# Patient Record
Sex: MALE | Race: WHITE | NOT HISPANIC OR LATINO | Employment: OTHER | URBAN - METROPOLITAN AREA
[De-identification: names, ages, dates, MRNs, and addresses within clinical notes are randomized per-mention and may not be internally consistent; named-entity substitution may affect disease eponyms.]

---

## 2020-10-26 ENCOUNTER — HISTORICAL (OUTPATIENT)
Dept: ENDOSCOPY | Facility: HOSPITAL | Age: 65
End: 2020-10-26

## 2020-12-08 ENCOUNTER — HISTORICAL (OUTPATIENT)
Dept: ADMINISTRATIVE | Facility: HOSPITAL | Age: 65
End: 2020-12-08

## 2020-12-08 LAB
ABS NEUT (OLG): 4.3 X10(3)/MCL (ref 2.1–9.2)
ALBUMIN SERPL-MCNC: 4.4 GM/DL (ref 3.4–5)
ALBUMIN/GLOB SERPL: 1.76 {RATIO} (ref 1.5–2.5)
ALP SERPL-CCNC: 77 UNIT/L (ref 38–126)
ALT SERPL-CCNC: 34 UNIT/L (ref 7–52)
APPEARANCE, UA: CLEAR
AST SERPL-CCNC: 35 UNIT/L (ref 15–37)
BACTERIA #/AREA URNS AUTO: ABNORMAL /HPF
BILIRUB SERPL-MCNC: 0.7 MG/DL (ref 0.2–1)
BILIRUB UR QL STRIP: NEGATIVE MG/DL
BILIRUBIN DIRECT+TOT PNL SERPL-MCNC: 0.2 MG/DL (ref 0–0.5)
BILIRUBIN DIRECT+TOT PNL SERPL-MCNC: 0.5 MG/DL
BUN SERPL-MCNC: 19 MG/DL (ref 7–18)
CALCIUM SERPL-MCNC: 9.3 MG/DL (ref 8.5–10.1)
CHLORIDE SERPL-SCNC: 103 MMOL/L (ref 98–107)
CHOLEST SERPL-MCNC: 133 MG/DL (ref 0–200)
CHOLEST/HDLC SERPL: 3.4 {RATIO}
CO2 SERPL-SCNC: 27 MMOL/L (ref 21–32)
COLOR UR: YELLOW
CREAT SERPL-MCNC: 1.13 MG/DL (ref 0.6–1.3)
ERYTHROCYTE [DISTWIDTH] IN BLOOD BY AUTOMATED COUNT: 13.2 % (ref 11.5–17)
EST. AVERAGE GLUCOSE BLD GHB EST-MCNC: 174 MG/DL
GLOBULIN SER-MCNC: 2.5 GM/DL (ref 1.2–3)
GLUCOSE (UA): NEGATIVE MG/DL
GLUCOSE SERPL-MCNC: 120 MG/DL (ref 74–106)
HBA1C MFR BLD: 7.7 % (ref 4.4–6.4)
HCT VFR BLD AUTO: 44.5 % (ref 42–52)
HDLC SERPL-MCNC: 39 MG/DL (ref 35–60)
HGB BLD-MCNC: 14.9 GM/DL (ref 14–18)
HGB UR QL STRIP: NEGATIVE UNIT/L
KETONES UR QL STRIP: NEGATIVE MG/DL
LDLC SERPL CALC-MCNC: 72 MG/DL (ref 0–129)
LEUKOCYTE ESTERASE UR QL STRIP: NEGATIVE UNIT/L
LYMPHOCYTES # BLD AUTO: 1.5 X10(3)/MCL (ref 0.6–3.4)
LYMPHOCYTES NFR BLD AUTO: 23.4 % (ref 13–40)
MCH RBC QN AUTO: 28.8 PG (ref 27–31.2)
MCHC RBC AUTO-ENTMCNC: 34 GM/DL (ref 32–36)
MCV RBC AUTO: 86 FL (ref 80–94)
MONOCYTES # BLD AUTO: 0.7 X10(3)/MCL (ref 0.1–1.3)
MONOCYTES NFR BLD AUTO: 10.9 % (ref 0.1–24)
NEUTROPHILS NFR BLD AUTO: 65.7 % (ref 47–80)
NITRITE UR QL STRIP.AUTO: NEGATIVE
PH UR STRIP: 5 [PH]
PLATELET # BLD AUTO: 123 X10(3)/MCL (ref 130–400)
PMV BLD AUTO: 9.5 FL (ref 9.4–12.4)
POTASSIUM SERPL-SCNC: 4.7 MMOL/L (ref 3.5–5.1)
PROT SERPL-MCNC: 6.9 GM/DL (ref 6.4–8.2)
PROT UR QL STRIP: ABNORMAL MG/DL
RBC # BLD AUTO: 5.17 X10(6)/MCL (ref 4.7–6.1)
RBC #/AREA URNS HPF: ABNORMAL /HPF
SODIUM SERPL-SCNC: 138 MMOL/L (ref 136–145)
SP GR UR STRIP: 1.02
SQUAMOUS EPITHELIAL, UA: ABNORMAL /LPF
TRIGL SERPL-MCNC: 250 MG/DL (ref 30–150)
UROBILINOGEN UR STRIP-ACNC: 0.2 MG/DL
VLDLC SERPL CALC-MCNC: 50 MG/DL
WBC # SPEC AUTO: 6.5 X10(3)/MCL (ref 4.5–11.5)
WBC #/AREA URNS AUTO: ABNORMAL /[HPF]

## 2020-12-09 ENCOUNTER — HISTORICAL (OUTPATIENT)
Dept: ENDOSCOPY | Facility: HOSPITAL | Age: 65
End: 2020-12-09

## 2020-12-09 LAB — CRC RECOMMENDATION EXT: NORMAL

## 2021-03-03 ENCOUNTER — HISTORICAL (OUTPATIENT)
Dept: ADMINISTRATIVE | Facility: HOSPITAL | Age: 66
End: 2021-03-03

## 2021-03-03 LAB
ALBUMIN SERPL-MCNC: 4.6 GM/DL (ref 3.4–5)
ALBUMIN/GLOB SERPL: 1.77 {RATIO} (ref 1.5–2.5)
ALP SERPL-CCNC: 75 UNIT/L (ref 38–126)
ALT SERPL-CCNC: 31 UNIT/L (ref 7–52)
AST SERPL-CCNC: 28 UNIT/L (ref 15–37)
BILIRUB SERPL-MCNC: 0.5 MG/DL (ref 0.2–1)
BILIRUBIN DIRECT+TOT PNL SERPL-MCNC: 0.2 MG/DL (ref 0–0.5)
BILIRUBIN DIRECT+TOT PNL SERPL-MCNC: 0.3 MG/DL
BUN SERPL-MCNC: 17 MG/DL (ref 7–18)
CALCIUM SERPL-MCNC: 9.6 MG/DL (ref 8.5–10.1)
CHLORIDE SERPL-SCNC: 106 MMOL/L (ref 98–107)
CO2 SERPL-SCNC: 27 MMOL/L (ref 21–32)
CREAT SERPL-MCNC: 1.21 MG/DL (ref 0.6–1.3)
CRP SERPL HS-MCNC: 0.74 MG/DL
ERYTHROCYTE [SEDIMENTATION RATE] IN BLOOD: 12 MM/HR (ref 0–15)
EST. AVERAGE GLUCOSE BLD GHB EST-MCNC: 151 MG/DL
GLOBULIN SER-MCNC: 2.6 GM/DL (ref 1.2–3)
GLUCOSE SERPL-MCNC: 122 MG/DL (ref 74–106)
HBA1C MFR BLD: 6.9 % (ref 4.4–6.4)
POTASSIUM SERPL-SCNC: 4.5 MMOL/L (ref 3.5–5.1)
PROT SERPL-MCNC: 7.2 GM/DL (ref 6.4–8.2)
SODIUM SERPL-SCNC: 142 MMOL/L (ref 136–145)

## 2021-03-15 ENCOUNTER — HISTORICAL (OUTPATIENT)
Dept: CARDIOLOGY | Facility: HOSPITAL | Age: 66
End: 2021-03-15

## 2021-06-07 ENCOUNTER — HISTORICAL (OUTPATIENT)
Dept: ADMINISTRATIVE | Facility: HOSPITAL | Age: 66
End: 2021-06-07

## 2021-06-07 LAB
ALBUMIN SERPL-MCNC: 4.5 GM/DL (ref 3.4–5)
ALBUMIN/GLOB SERPL: 1.96 {RATIO} (ref 1.5–2.5)
ALP SERPL-CCNC: 77 UNIT/L (ref 38–126)
ALT SERPL-CCNC: 36 UNIT/L (ref 7–52)
AST SERPL-CCNC: 32 UNIT/L (ref 15–37)
BILIRUB SERPL-MCNC: 0.6 MG/DL (ref 0.2–1)
BILIRUBIN DIRECT+TOT PNL SERPL-MCNC: 0.1 MG/DL (ref 0–0.5)
BILIRUBIN DIRECT+TOT PNL SERPL-MCNC: 0.5 MG/DL
BUN SERPL-MCNC: 21 MG/DL (ref 7–18)
CALCIUM SERPL-MCNC: 9.2 MG/DL (ref 8.5–10.1)
CHLORIDE SERPL-SCNC: 105 MMOL/L (ref 98–107)
CO2 SERPL-SCNC: 25 MMOL/L (ref 21–32)
CREAT SERPL-MCNC: 1.07 MG/DL (ref 0.6–1.3)
EST. AVERAGE GLUCOSE BLD GHB EST-MCNC: 232 MG/DL
GLOBULIN SER-MCNC: 2.3 GM/DL (ref 1.2–3)
GLUCOSE SERPL-MCNC: 280 MG/DL (ref 74–106)
HBA1C MFR BLD: 9.7 % (ref 4.4–6.4)
POTASSIUM SERPL-SCNC: 4.7 MMOL/L (ref 3.5–5.1)
PROT SERPL-MCNC: 6.8 GM/DL (ref 6.4–8.2)
SODIUM SERPL-SCNC: 134 MMOL/L (ref 136–145)

## 2021-09-07 ENCOUNTER — HISTORICAL (OUTPATIENT)
Dept: ADMINISTRATIVE | Facility: HOSPITAL | Age: 66
End: 2021-09-07

## 2021-09-07 LAB
ALBUMIN SERPL-MCNC: 4.6 GM/DL (ref 3.4–5)
ALBUMIN/GLOB SERPL: 1.77 {RATIO} (ref 1.5–2.5)
ALP SERPL-CCNC: 71 UNIT/L (ref 38–126)
ALT SERPL-CCNC: 30 UNIT/L (ref 7–52)
AST SERPL-CCNC: 32 UNIT/L (ref 15–37)
BILIRUB SERPL-MCNC: 0.6 MG/DL (ref 0.2–1)
BILIRUBIN DIRECT+TOT PNL SERPL-MCNC: 0.1 MG/DL (ref 0–0.5)
BILIRUBIN DIRECT+TOT PNL SERPL-MCNC: 0.5 MG/DL
BUN SERPL-MCNC: 23 MG/DL (ref 7–18)
CALCIUM SERPL-MCNC: 10 MG/DL (ref 8.5–10.1)
CHLORIDE SERPL-SCNC: 102 MMOL/L (ref 98–107)
CO2 SERPL-SCNC: 24 MMOL/L (ref 21–32)
CREAT SERPL-MCNC: 1.07 MG/DL (ref 0.6–1.3)
EST. AVERAGE GLUCOSE BLD GHB EST-MCNC: 157 MG/DL
GLOBULIN SER-MCNC: 2.6 GM/DL (ref 1.2–3)
GLUCOSE SERPL-MCNC: 124 MG/DL (ref 74–106)
HBA1C MFR BLD: 7.1 % (ref 4.4–6.4)
POTASSIUM SERPL-SCNC: 4.5 MMOL/L (ref 3.5–5.1)
PROT SERPL-MCNC: 7.2 GM/DL (ref 6.4–8.2)
SODIUM SERPL-SCNC: 140 MMOL/L (ref 136–145)

## 2021-12-07 ENCOUNTER — HISTORICAL (OUTPATIENT)
Dept: ADMINISTRATIVE | Facility: HOSPITAL | Age: 66
End: 2021-12-07

## 2021-12-07 LAB
ALBUMIN SERPL-MCNC: 4.6 GM/DL (ref 3.4–5)
ALBUMIN/GLOB SERPL: 2.09 {RATIO} (ref 1.5–2.5)
ALP SERPL-CCNC: 71 UNIT/L (ref 38–126)
ALT SERPL-CCNC: 34 UNIT/L (ref 7–52)
AST SERPL-CCNC: 33 UNIT/L (ref 15–37)
BILIRUB SERPL-MCNC: 0.6 MG/DL (ref 0.2–1)
BILIRUBIN DIRECT+TOT PNL SERPL-MCNC: 0.2 MG/DL (ref 0–0.5)
BILIRUBIN DIRECT+TOT PNL SERPL-MCNC: 0.4 MG/DL
BUN SERPL-MCNC: 24 MG/DL (ref 7–18)
CALCIUM SERPL-MCNC: 9.2 MG/DL (ref 8.5–10.1)
CHLORIDE SERPL-SCNC: 100 MMOL/L (ref 98–107)
CO2 SERPL-SCNC: 29 MMOL/L (ref 21–32)
CREAT SERPL-MCNC: 1.23 MG/DL (ref 0.6–1.3)
EST. AVERAGE GLUCOSE BLD GHB EST-MCNC: 192 MG/DL
GLOBULIN SER-MCNC: 2.2 GM/DL (ref 1.2–3)
GLUCOSE SERPL-MCNC: 156 MG/DL (ref 74–106)
HBA1C MFR BLD: 8.3 % (ref 4.4–6.4)
POTASSIUM SERPL-SCNC: 4.9 MMOL/L (ref 3.5–5.1)
PROT SERPL-MCNC: 6.8 GM/DL (ref 6.4–8.2)
SODIUM SERPL-SCNC: 138 MMOL/L (ref 136–145)

## 2022-04-11 ENCOUNTER — HISTORICAL (OUTPATIENT)
Dept: ADMINISTRATIVE | Facility: HOSPITAL | Age: 67
End: 2022-04-11

## 2022-04-28 VITALS
BODY MASS INDEX: 42.66 KG/M2 | WEIGHT: 315 LBS | HEIGHT: 72 IN | OXYGEN SATURATION: 94 % | DIASTOLIC BLOOD PRESSURE: 86 MMHG | SYSTOLIC BLOOD PRESSURE: 140 MMHG

## 2022-05-26 PROBLEM — I25.118 CORONARY ARTERY DISEASE OF NATIVE ARTERY OF NATIVE HEART WITH STABLE ANGINA PECTORIS: Status: ACTIVE | Noted: 2021-08-20

## 2022-05-26 PROBLEM — G47.33 OSA ON CPAP: Status: ACTIVE | Noted: 2022-01-07

## 2022-05-26 PROBLEM — Z86.718 PERSONAL HISTORY OF DVT (DEEP VEIN THROMBOSIS): Status: ACTIVE | Noted: 2022-01-10

## 2022-05-26 PROBLEM — I48.0 PAF (PAROXYSMAL ATRIAL FIBRILLATION): Status: ACTIVE | Noted: 2022-01-10

## 2022-05-26 PROBLEM — R60.0 EDEMA OF LOWER EXTREMITY: Status: ACTIVE | Noted: 2022-05-26

## 2022-05-26 PROBLEM — G89.4 CHRONIC PAIN SYNDROME: Status: ACTIVE | Noted: 2022-05-26

## 2022-05-26 PROBLEM — N40.1 BENIGN PROSTATIC HYPERPLASIA WITH URINARY OBSTRUCTION: Status: ACTIVE | Noted: 2022-05-26

## 2022-05-26 PROBLEM — I35.0 SEVERE AORTIC VALVE STENOSIS: Status: ACTIVE | Noted: 2022-05-26

## 2022-05-26 PROBLEM — I87.2 VENOUS INSUFFICIENCY: Status: ACTIVE | Noted: 2022-05-26

## 2022-05-26 PROBLEM — T88.4XXA DIFFICULT AIRWAY: Status: ACTIVE | Noted: 2022-01-10

## 2022-05-26 PROBLEM — M19.90 OSTEOARTHRITIS: Status: ACTIVE | Noted: 2022-05-26

## 2022-05-26 PROBLEM — N13.8 BENIGN PROSTATIC HYPERPLASIA WITH URINARY OBSTRUCTION: Status: ACTIVE | Noted: 2022-05-26

## 2022-05-26 PROBLEM — I10 HTN (HYPERTENSION): Status: ACTIVE | Noted: 2022-05-26

## 2022-05-26 PROBLEM — Z86.711 PERSONAL HISTORY OF PULMONARY EMBOLISM: Status: ACTIVE | Noted: 2022-01-09

## 2022-05-26 PROBLEM — I82.409 DEEP VEIN THROMBOSIS (DVT) OF LOWER EXTREMITY: Status: ACTIVE | Noted: 2022-03-14

## 2022-05-26 PROBLEM — E66.01 MORBID OBESITY: Status: ACTIVE | Noted: 2022-01-07

## 2022-05-26 PROBLEM — E11.9 DMII (DIABETES MELLITUS, TYPE 2): Status: ACTIVE | Noted: 2022-05-26

## 2022-06-09 ENCOUNTER — LAB REQUISITION (OUTPATIENT)
Dept: LAB | Facility: HOSPITAL | Age: 67
End: 2022-06-09
Payer: MEDICARE

## 2022-06-09 DIAGNOSIS — I25.119 ATHEROSCLEROTIC HEART DISEASE OF NATIVE CORONARY ARTERY WITH UNSPECIFIED ANGINA PECTORIS: ICD-10-CM

## 2022-06-09 LAB
INR BLD: 5.29 (ref 0–1.3)
PROTHROMBIN TIME: 47.2 SECONDS (ref 12.5–14.5)

## 2022-06-09 PROCEDURE — 85610 PROTHROMBIN TIME: CPT | Performed by: FAMILY MEDICINE

## 2022-06-13 ENCOUNTER — OFFICE VISIT (OUTPATIENT)
Dept: ORTHOPEDICS | Facility: CLINIC | Age: 67
End: 2022-06-13
Payer: MEDICARE

## 2022-06-13 ENCOUNTER — HOSPITAL ENCOUNTER (OUTPATIENT)
Dept: RADIOLOGY | Facility: CLINIC | Age: 67
Discharge: HOME | End: 2022-06-13
Attending: REHABILITATION UNIT
Payer: MEDICARE

## 2022-06-13 VITALS — HEIGHT: 73 IN | WEIGHT: 315 LBS | BODY MASS INDEX: 41.75 KG/M2

## 2022-06-13 DIAGNOSIS — M25.562 LEFT KNEE PAIN, UNSPECIFIED CHRONICITY: ICD-10-CM

## 2022-06-13 DIAGNOSIS — M25.562 LEFT KNEE PAIN, UNSPECIFIED CHRONICITY: Primary | ICD-10-CM

## 2022-06-13 DIAGNOSIS — M17.12 OSTEOARTHRITIS OF LEFT KNEE, UNSPECIFIED OSTEOARTHRITIS TYPE: ICD-10-CM

## 2022-06-13 PROCEDURE — 99203 OFFICE O/P NEW LOW 30 MIN: CPT | Mod: ,,, | Performed by: REHABILITATION UNIT

## 2022-06-13 PROCEDURE — 73564 XR KNEE COMP 4 OR MORE VIEWS LEFT: ICD-10-PCS | Mod: LT,,, | Performed by: REHABILITATION UNIT

## 2022-06-13 PROCEDURE — 73564 X-RAY EXAM KNEE 4 OR MORE: CPT | Mod: LT,,, | Performed by: REHABILITATION UNIT

## 2022-06-13 PROCEDURE — 99203 PR OFFICE/OUTPT VISIT, NEW, LEVL III, 30-44 MIN: ICD-10-PCS | Mod: ,,, | Performed by: REHABILITATION UNIT

## 2022-06-13 NOTE — PROGRESS NOTES
Subjective:      Issa Valladares is a 66 y.o. male referred by Dr. Alfaro for evaluation and treatment of left knee pain. The pain began around 5 weeks ago. He denies any trauma or distinct injury. He is using a walker today and has used it since his pain flared up the past few weeks. He was provided with a steroid dose sherin which has provided pain relief. He is in pain management and takes oxycodone and morphine for back pain and fibromylagia. He reports that his pain level is around 2-3/10 currently. Pain is worse with activity and somewhat better with rest. He has not had any PT. R knee CSI years ago that lasted a few months. He has had an MRI completed. He reports around a 50 pound weight loss in the recent past.     PMH significant for DVT and PE around 8 years ago. He started coumadin a few months ago after transitioning from eliquis. He has had difficulties with coagulation lab control. Aortic valve replacement around 4 months ago and reports he is doing well. Reports blood glucose levels around 200.       Comprehensive review of systems completed and negative except as per HPI.    Past Medical History:   Diagnosis Date    Aortic stenosis, severe     BPH with obstruction/lower urinary tract symptoms     Essential (primary) hypertension     Fibromyalgia     History of DVT (deep vein thrombosis)     Hx of pulmonary embolus     Type 2 diabetes mellitus with unspecified diabetic retinopathy without macular edema     Unspecified osteoarthritis, unspecified site        Past Surgical History:   Procedure Laterality Date    1 vessel CABG (01/07/2022)      Angiogram (05/14/2021)      AVR - Bovine (01/07/2022)      Biopsy Gastrointestinal (None) (10/26/2020)      Bleeder Control Gastrointestinal (None)       CHOLECYSTECTOMY      Colonoscopy (None) (10/26/2020)      Polypectomy (None) (10/26/2020)      Polypectomy (None) (12/09/2020)      RIGHT SHOULDER         History reviewed. No pertinent family  "history.    Social History     Socioeconomic History    Marital status:    Tobacco Use    Smoking status: Former Smoker    Smokeless tobacco: Never Used   Substance and Sexual Activity    Alcohol use: Yes     Alcohol/week: 1.0 - 2.0 standard drink     Types: 1 - 2 Cans of beer per week    Drug use: Never       Current Outpatient Medications   Medication Sig Dispense Refill    acetaminophen (TYLENOL) 325 MG tablet Take 650 mg by mouth every 4 (four) hours as needed.      amiodarone (PACERONE) 200 MG Tab Take 200 mg by mouth.      amLODIPine (NORVASC) 10 MG tablet Take 10 mg by mouth.      atorvastatin (LIPITOR) 10 MG tablet Take 10 mg by mouth.      BD VIKAS 2ND GEN PEN NEEDLE 32 gauge x 5/32" Ndle       blood sugar diagnostic Strp Use as directed three times daily      furosemide (LASIX) 40 MG tablet       insulin detemir U-100 (LEVEMIR FLEXTOUCH U-100 INSULN) 100 unit/mL (3 mL) InPn pen Inject 30 Units into the skin once daily. 1 packet 5    insulin lispro 100 unit/mL pen Inject 15 Units into the skin.      lancets 33 gauge Misc 1 each by Other route.      lisinopriL-hydrochlorothiazide (PRINZIDE,ZESTORETIC) 20-25 mg Tab Take 1 tablet by mouth 2 (two) times daily.      metoprolol succinate (TOPROL-XL) 25 MG 24 hr tablet Take 25 mg by mouth.      metoprolol succinate (TOPROL-XL) 50 MG 24 hr tablet Take 50 mg by mouth.      morphine (MS CONTIN) 15 MG 12 hr tablet Take 15 mg by mouth 2 (two) times daily as needed.      multivit-iron-FA-calcium-mins 9 mg iron-400 mcg Tab tablet Take 1 tablet by mouth once daily.      oxyCODONE-acetaminophen (PERCOCET)  mg per tablet       tiZANidine 4 mg Cap Take 4 mg by mouth 3 (three) times daily as needed.      warfarin (COUMADIN) 5 MG tablet       predniSONE (DELTASONE) 20 MG tablet Take 2 tablets (40 mg total) by mouth once daily. for 7 days 14 tablet 0     No current facility-administered medications for this visit.       Review of patient's " allergies indicates:  No Known Allergies    Objective:         Head: Normocephalic, without obvious abnormality, atraumatic  Eyes: conjunctivae/corneas clear. EOM's intact  Ears: normal external appearance  Nose: Nares normal. Septum midline. Mucosa normal. No drainage  Throat: normal findings: lips normal without lesions  Lungs: unlabored breathing on room air  Chest wall: symmetric chest rise  Heart: regular rate and rhythm  Pulses: 2+ and symmetric  Skin: Skin color, texture, turgor normal. No rashes or lesions  Neurologic: Grossly normal    LEFT KNEE:     Alignment: neutral  Appearance: skin in intact without lesion  Effusion:   Gait: antalgic with a walker  Straight Leg Raise: negative  Log Roll: negative  Hip ROM: full and painless  Ankle ROM: full and painless   Knee ROM:  0-115  Tenderness: TTP at the medial joint line and posteriorly   Patellar Mobility: decreased and painful   Patellar grind: +  PF Crepitus: +    Solange Test: +  Valgus Stress @ 0 deg: stable  Valgus Stress @ 30 deg: stable  Varus Stress @ 0 deg: stable  Varus Stress @ 30 deg: stable  Lachman: stable  Ant Drawer: negative   Post Drawer: negative  Posterior Sag Sign: negative  Neurological deficits: SILT dp/sp/t distributions  Motor: 5/5 EHL/FHL/TA/GS    Warm well perfused lower extremity with capillary refill less than 2 seconds and sensation intact to light touch in the terminal nerve distributions. Calf soft and easily compressible without clinical sign of DVT. No palpable popliteal lymphadenopathy. Chronic venous stasis changes overlying anterior tibia      Imaging  Four view radiographs of bilateral knees obtained today personally region acute fracture dislocation.  Advanced arthritic changes most severely in the medial compartments with loss of joint space and surrounding osteophyte formation.  Small osteophytes about the patellofemoral compartment as well.    Results for orders placed or performed in visit on 05/26/22 (from the past  2160 hour(s))   MRI Knee Without Contrast Left    Impression    Left knee moderate joint effusion.  Large Baker cyst, containing multiple small intra-articular bodies.    Partially imaged ganglion cyst within the calf muscles containing intra-articular bodies, appearing to arise from the popliteal tendon sheath.    Radial tears involving the medial and lateral meniscus posterior horn root.    Extrusion of the medial meniscus body from the joint, with degenerative signal throughout the medial meniscus body and posterior horn.    Mucoid degeneration of the anterior and posterior cruciate ligaments.    Advanced medial and moderate patellofemoral compartment arthritis.      Electronically signed by: Giuseppe Galarza  Date:    06/08/2022  Time:    08:47      Assessment:     66M with primary OA of his left knee     Plan:     Imaging and exam findings discussed with the patient.  He has radiographic evidence of end-stage arthritis changes of his left knee.  MRI findings consistent with arthritis changes as well as subsequent meniscal tears which are expected in the setting of this advanced arthritis.  We discussed his options.  His medical condition currently limits some of his options.  He is unable to take nonsteroidal anti-inflammatories.  He has got good relief from steroids but this isn't a great long term option.  Unfortunately he is diabetic and this is not well controlled. He reports that his blood sugar has been around 200 level. He is in pain management for other issues.  He has done a good job with weight loss but would benefit from further weight loss.  We discussed the implications on his current state as well as the ability to have surgery in the future.  He is on Coumadin currently with the elevated INR.  I think he would benefit from a corticosteroid injection, but given his coagulation labs will defer injection at this time.  He will be candidate once his labs are in the therapeutic range.  He is not a  candidate for meniscal surgery.  He may benefit from an arthroplasty in the future but this would require a BMI less than 40 and a hemoglobin A1c under 7 all this was discussed with the patient.  He will call back if his coagulation panel is improved and he desires to go forward with an injection.  All his questions were answered and he was in agreement.

## 2022-06-14 PROBLEM — M17.12 OSTEOARTHRITIS OF LEFT KNEE: Status: ACTIVE | Noted: 2022-06-14

## 2022-06-28 DIAGNOSIS — M89.9 RIB LESION: Primary | ICD-10-CM

## 2022-06-29 ENCOUNTER — OFFICE VISIT (OUTPATIENT)
Dept: ORTHOPEDICS | Facility: CLINIC | Age: 67
End: 2022-06-29
Payer: MEDICARE

## 2022-06-29 VITALS — WEIGHT: 315 LBS | HEIGHT: 73 IN | BODY MASS INDEX: 41.75 KG/M2

## 2022-06-29 DIAGNOSIS — M17.11 PRIMARY OSTEOARTHRITIS OF RIGHT KNEE: ICD-10-CM

## 2022-06-29 DIAGNOSIS — M17.12 OSTEOARTHRITIS OF LEFT KNEE, UNSPECIFIED OSTEOARTHRITIS TYPE: Primary | ICD-10-CM

## 2022-06-29 PROCEDURE — 20610 LARGE JOINT ASPIRATION/INJECTION: BILATERAL KNEE: ICD-10-PCS | Mod: 50,,, | Performed by: REHABILITATION UNIT

## 2022-06-29 PROCEDURE — 99213 PR OFFICE/OUTPT VISIT, EST, LEVL III, 20-29 MIN: ICD-10-PCS | Mod: 25,,, | Performed by: REHABILITATION UNIT

## 2022-06-29 PROCEDURE — 99213 OFFICE O/P EST LOW 20 MIN: CPT | Mod: 25,,, | Performed by: REHABILITATION UNIT

## 2022-06-29 PROCEDURE — 20610 DRAIN/INJ JOINT/BURSA W/O US: CPT | Mod: 50,,, | Performed by: REHABILITATION UNIT

## 2022-06-29 RX ADMIN — BETAMETHASONE SODIUM PHOSPHATE AND BETAMETHASONE ACETATE 12 MG: 3; 3 INJECTION, SUSPENSION INTRA-ARTICULAR; INTRALESIONAL; INTRAMUSCULAR; SOFT TISSUE at 02:06

## 2022-06-29 RX ADMIN — LIDOCAINE HYDROCHLORIDE 2 MG: 20 INJECTION, SOLUTION INFILTRATION; PERINEURAL at 02:06

## 2022-06-29 NOTE — PROGRESS NOTES
Subjective:      Issa Valladares is a 67 y.o. male who presents today for follow up evaluation of his left knee. He was seen initially two weeks ago. He had an exacerbation of left knee pain. He has end-stage arthritis changes on radiographs. Unfortunately, he is on coumadin and his INR was greatly elevated. He presents today with INR within the therapeutic range. He is interested in CSI. He also reports that his right knee is bothering him. Pain with activities and better with rest. No sensorimotor changes.     Initial HPI:  Referred by Dr. Alfaro for evaluation and treatment of left knee pain. The pain began around 5 weeks ago. He denies any trauma or distinct injury. He is using a walker today and has used it since his pain flared up the past few weeks. He was provided with a steroid dose sherin which has provided pain relief. He is in pain management and takes oxycodone and morphine for back pain and fibromylagia. He reports that his pain level is around 2-3/10 currently. Pain is worse with activity and somewhat better with rest. He has not had any PT. R knee CSI years ago that lasted a few months. He has had an MRI completed. He reports around a 50 pound weight loss in the recent past.     PMH significant for DVT and PE around 8 years ago. He started coumadin a few months ago after transitioning from eliquis. He has had difficulties with coagulation lab control. Aortic valve replacement around 4 months ago and reports he is doing well. Reports blood glucose levels around 200.       Comprehensive review of systems completed and negative except as per HPI.    Past Medical History:   Diagnosis Date    Aortic stenosis, severe     BPH with obstruction/lower urinary tract symptoms     Essential (primary) hypertension     Fibromyalgia     History of DVT (deep vein thrombosis)     Hx of pulmonary embolus     Personal history of colonic polyps 12/09/2020    COLONOSCOPY    Type 2 diabetes mellitus with unspecified  "diabetic retinopathy without macular edema     Unspecified osteoarthritis, unspecified site        Past Surgical History:   Procedure Laterality Date    1 vessel CABG (01/07/2022)      Angiogram (05/14/2021)      AVR - Bovine (01/07/2022)      Biopsy Gastrointestinal (None) (10/26/2020)      Bleeder Control Gastrointestinal (None)       CHOLECYSTECTOMY      COLONOSCOPY  12/09/2020    Ramon Desouza MD    Colonoscopy (None) (10/26/2020)      Polypectomy (None) (10/26/2020)      Polypectomy (None) (12/09/2020)      RIGHT SHOULDER         History reviewed. No pertinent family history.    Social History     Socioeconomic History    Marital status:    Tobacco Use    Smoking status: Former    Smokeless tobacco: Never   Substance and Sexual Activity    Alcohol use: Yes     Alcohol/week: 1.0 - 2.0 standard drink     Types: 1 - 2 Cans of beer per week    Drug use: Never       Current Outpatient Medications   Medication Sig Dispense Refill    amiodarone (PACERONE) 200 MG Tab Take 200 mg by mouth.      amLODIPine (NORVASC) 10 MG tablet Take 10 mg by mouth.      apixaban (ELIQUIS) 5 mg Tab Take 1 tablet (5 mg total) by mouth 2 (two) times daily. 180 tablet 3    atorvastatin (LIPITOR) 10 MG tablet Take 10 mg by mouth.      BD VIKAS 2ND GEN PEN NEEDLE 32 gauge x 5/32" Ndle       blood sugar diagnostic Strp Use as directed three times daily      furosemide (LASIX) 40 MG tablet       insulin detemir U-100 (LEVEMIR FLEXTOUCH U-100 INSULN) 100 unit/mL (3 mL) InPn pen Inject 35 Units into the skin once daily. 12 mL 5    insulin lispro 100 unit/mL pen Inject 20 Units into the skin 3 (three) times daily before meals. 18 mL 11    lancets 33 gauge Misc 1 each by Other route.      lisinopriL-hydrochlorothiazide (PRINZIDE,ZESTORETIC) 20-25 mg Tab Take 1 tablet by mouth once daily.      metoprolol succinate (TOPROL-XL) 25 MG 24 hr tablet Take 25 mg by mouth.      metoprolol succinate (TOPROL-XL) 50 MG 24 hr tablet Take 1.5 tablets " (75 mg total) by mouth once daily. 135 tablet 3    morphine (MS CONTIN) 15 MG 12 hr tablet Take 15 mg by mouth 2 (two) times daily as needed.      multivit-iron-FA-calcium-mins 9 mg iron-400 mcg Tab tablet Take 1 tablet by mouth once daily.      NOVOLOG FLEXPEN U-100 INSULIN 100 unit/mL (3 mL) InPn pen Inject into the skin.      ondansetron (ZOFRAN-ODT) 4 MG TbDL Take 2 tablets (8 mg total) by mouth every 8 (eight) hours as needed (nausea/vomiting). 10 tablet 0    oxyCODONE-acetaminophen (PERCOCET)  mg per tablet       tiZANidine 4 mg Cap Take 4 mg by mouth 3 (three) times daily as needed.       No current facility-administered medications for this visit.       Review of patient's allergies indicates:  No Known Allergies    Objective:         Head: Normocephalic, without obvious abnormality, atraumatic  Eyes: conjunctivae/corneas clear. EOM's intact  Ears: normal external appearance  Nose: Nares normal. Septum midline. Mucosa normal. No drainage  Throat: normal findings: lips normal without lesions  Lungs: unlabored breathing on room air  Chest wall: symmetric chest rise  Heart: regular rate and rhythm  Pulses: 2+ and symmetric  Skin: Skin color, texture, turgor normal. No rashes or lesions  Neurologic: Grossly normal    Bilateral KNEE:     Alignment: neutral  Appearance: skin in intact without lesion  Effusion: none  Gait: antalgic with a walker  Straight Leg Raise: negative  Log Roll: negative  Hip ROM: full and painless  Ankle ROM: full and painless   Knee ROM:  0-115  Tenderness: TTP at the medial joint line and posteriorly   Patellar Mobility: decreased and painful   Patellar grind: +  PF Crepitus: +    Solange Test: +  Valgus Stress @ 0 deg: stable  Valgus Stress @ 30 deg: stable  Varus Stress @ 0 deg: stable  Varus Stress @ 30 deg: stable  Lachman: stable  Ant Drawer: negative   Post Drawer: negative  Posterior Sag Sign: negative  Neurological deficits: SILT dp/sp/t distributions  Motor: 5/5  EHL/FHL/TA/GS    Warm well perfused lower extremity with capillary refill less than 2 seconds and sensation intact to light touch in the terminal nerve distributions. Calf soft and easily compressible without clinical sign of DVT. No palpable popliteal lymphadenopathy. Chronic venous stasis changes overlying anterior tibia    Imaging  Four view radiographs of bilateral knees obtained previously show no acute fracture dislocation.  Advanced arthritic changes most severely in the medial compartments with loss of joint space and surrounding osteophyte formation.  Small osteophytes about the patellofemoral compartment as well.    Large Joint Aspiration/Injection: bilateral knee    Date/Time: 6/29/2022 2:45 PM  Performed by: Christopher Fernando MD  Authorized by: Christopher Fernando MD     Consent Done?:  Yes (Verbal)  Indications:  Arthritis and pain  Site marked: the procedure site was marked    Timeout: prior to procedure the correct patient, procedure, and site was verified    Prep: patient was prepped and draped in usual sterile fashion      Local anesthesia used?: Yes    Local anesthetic:  Topical anesthetic    Details:  Needle Size:  21 G  Ultrasonic Guidance for needle placement?: No    Approach:  Anterolateral  Location:  Knee  Laterality:  Bilateral  Site:  Bilateral knee  Medications (Right):  2 mg LIDOcaine HCL 20 mg/ml (2%) 20 mg/mL (2 %); 12 mg betamethasone acetate-betamethasone sodium phosphate 6 mg/mL  Medications (Left):  2 mg LIDOcaine HCL 20 mg/ml (2%) 20 mg/mL (2 %); 12 mg betamethasone acetate-betamethasone sodium phosphate 6 mg/mL  Patient tolerance:  Patient tolerated the procedure well with no immediate complications      Assessment:     66M with primary OA of his bilateral knees     Plan:     Imaging and exam findings discussed with the patient.  He has radiographic evidence of end-stage arthritis changes of his bilateral knees. His INR is within the therapeutic range. He was provided with bilateral  knee CSI and tolerated this well as above. He will monitor his blood glucose which he reports has been doing well. He has had substantial weight loss and will continue to monitor for arthroplasty candidacy. He will follow up as needed. All his questions were answered and he was in agreement.

## 2022-07-06 ENCOUNTER — HOSPITAL ENCOUNTER (OUTPATIENT)
Dept: RADIOLOGY | Facility: CLINIC | Age: 67
Discharge: HOME | End: 2022-07-06
Attending: REHABILITATION UNIT
Payer: MEDICARE

## 2022-07-06 ENCOUNTER — OFFICE VISIT (OUTPATIENT)
Dept: ORTHOPEDICS | Facility: CLINIC | Age: 67
End: 2022-07-06
Payer: MEDICARE

## 2022-07-06 VITALS
DIASTOLIC BLOOD PRESSURE: 87 MMHG | SYSTOLIC BLOOD PRESSURE: 144 MMHG | HEART RATE: 86 BPM | BODY MASS INDEX: 41.75 KG/M2 | HEIGHT: 73 IN | WEIGHT: 315 LBS

## 2022-07-06 DIAGNOSIS — M25.511 ACUTE PAIN OF BOTH SHOULDERS: Primary | ICD-10-CM

## 2022-07-06 DIAGNOSIS — M25.511 ACUTE PAIN OF RIGHT SHOULDER: ICD-10-CM

## 2022-07-06 DIAGNOSIS — M25.512 ACUTE PAIN OF BOTH SHOULDERS: Primary | ICD-10-CM

## 2022-07-06 PROCEDURE — 73030 X-RAY EXAM OF SHOULDER: CPT | Mod: LT,,, | Performed by: REHABILITATION UNIT

## 2022-07-06 PROCEDURE — 73030 X-RAY EXAM OF SHOULDER: CPT | Mod: RT,,, | Performed by: REHABILITATION UNIT

## 2022-07-06 PROCEDURE — 99213 PR OFFICE/OUTPT VISIT, EST, LEVL III, 20-29 MIN: ICD-10-PCS | Mod: ,,, | Performed by: REHABILITATION UNIT

## 2022-07-06 PROCEDURE — 73030 XR SHOULDER COMPLETE 2 OR MORE VIEWS LEFT: ICD-10-PCS | Mod: LT,,, | Performed by: REHABILITATION UNIT

## 2022-07-06 PROCEDURE — 99213 OFFICE O/P EST LOW 20 MIN: CPT | Mod: ,,, | Performed by: REHABILITATION UNIT

## 2022-07-06 NOTE — PROGRESS NOTES
Subjective:      Patient ID: Issa Valladares is a 67 y.o. male.    Chief Complaint: Pain (R shoulder pain, pt states both shoulders are in pain, right hurts more than the left, said the pain started about two weeks ago, pt states pain is sharp stabbing pain and throbbing ache, pt is not able to sleep as pain keeps him up at the night, pt is taking oxycodone for pain with no to little relief, )    HPI:   Issa Valladares is a 67M who presents today for initial evaluation of his bilateral shoulders.  I have seen previously for knee pain.  He reports that since I last saw him he fell within the past couple weeks and landed on his upper extremities.  He has pain to both of his shoulders with the right shoulder bothering him more.  Pain is diffuse and global.  It is bothersome at night.  He has been taking narcotics with little to no relief.  No sensory motor changes reported distally however he has significant loss of range of motion of the shoulder with pain.  Patient has a pertinent past surgical history of right rotator cuff repair around 20-25 years ago.  He recovered well and has had no issues here recently.  Pain is worse with activity and better with rest.  He reports that his knees are improving.    Past Medical History:   Diagnosis Date    Aortic stenosis, severe     BPH with obstruction/lower urinary tract symptoms     Essential (primary) hypertension     Fibromyalgia     History of DVT (deep vein thrombosis)     Hx of pulmonary embolus     Personal history of colonic polyps 12/09/2020    COLONOSCOPY    Type 2 diabetes mellitus with unspecified diabetic retinopathy without macular edema     Unspecified osteoarthritis, unspecified site      Past Surgical History:   Procedure Laterality Date    1 vessel CABG (01/07/2022)      Angiogram (05/14/2021)      AVR - Bovine (01/07/2022)      Biopsy Gastrointestinal (None) (10/26/2020)      Bleeder Control Gastrointestinal (None)       CHOLECYSTECTOMY    "   COLONOSCOPY  12/09/2020    Ramon Desouza MD    Colonoscopy (None) (10/26/2020)      Polypectomy (None) (10/26/2020)      Polypectomy (None) (12/09/2020)      RIGHT SHOULDER       Social History     Socioeconomic History    Marital status:    Tobacco Use    Smoking status: Former Smoker    Smokeless tobacco: Never Used   Substance and Sexual Activity    Alcohol use: Yes     Alcohol/week: 1.0 - 2.0 standard drink     Types: 1 - 2 Cans of beer per week    Drug use: Never         Current Outpatient Medications:     amiodarone (PACERONE) 200 MG Tab, Take 200 mg by mouth., Disp: , Rfl:     amLODIPine (NORVASC) 10 MG tablet, Take 10 mg by mouth., Disp: , Rfl:     apixaban (ELIQUIS) 5 mg Tab, Take 1 tablet (5 mg total) by mouth 2 (two) times daily., Disp: 180 tablet, Rfl: 3    atorvastatin (LIPITOR) 10 MG tablet, Take 10 mg by mouth., Disp: , Rfl:     BD VIKAS 2ND GEN PEN NEEDLE 32 gauge x 5/32" Ndle, , Disp: , Rfl:     blood sugar diagnostic Strp, Use as directed three times daily, Disp: , Rfl:     furosemide (LASIX) 40 MG tablet, , Disp: , Rfl:     lancets 33 gauge Misc, 1 each by Other route., Disp: , Rfl:     lisinopriL-hydrochlorothiazide (PRINZIDE,ZESTORETIC) 20-25 mg Tab, Take 1 tablet by mouth 2 (two) times daily., Disp: , Rfl:     metoprolol succinate (TOPROL-XL) 25 MG 24 hr tablet, Take 25 mg by mouth., Disp: , Rfl:     metoprolol succinate (TOPROL-XL) 50 MG 24 hr tablet, Take 50 mg by mouth., Disp: , Rfl:     morphine (MS CONTIN) 15 MG 12 hr tablet, Take 15 mg by mouth 2 (two) times daily as needed., Disp: , Rfl:     multivit-iron-FA-calcium-mins 9 mg iron-400 mcg Tab tablet, Take 1 tablet by mouth once daily., Disp: , Rfl:     oxyCODONE-acetaminophen (PERCOCET)  mg per tablet, , Disp: , Rfl:     tiZANidine 4 mg Cap, Take 4 mg by mouth 3 (three) times daily as needed., Disp: , Rfl:     insulin detemir U-100 (LEVEMIR FLEXTOUCH U-100 INSULN) 100 unit/mL (3 mL) InPn pen, " "Inject 35 Units into the skin once daily., Disp: 12 mL, Rfl: 5    insulin lispro 100 unit/mL pen, Inject 20 Units into the skin 3 (three) times daily before meals., Disp: 18 mL, Rfl: 11  Review of patient's allergies indicates:  No Known Allergies    BP (!) 144/87   Pulse 86   Ht 6' 1" (1.854 m)   Wt (!) 153.3 kg (338 lb)   BMI 44.59 kg/m²     ROS   Comprehensive review of systems completed and negative except as per HPI.      Objective:    Ortho Exam   Head: Normocephalic, without obvious abnormality, atraumatic  Eyes: conjunctivae/corneas clear. EOM's intact  Ears: normal external appearance  Nose: Nares normal. Septum midline. Mucosa normal. No drainage  Throat: normal findings: lips normal without lesions  Lungs: unlabored breathing on room air  Chest wall: symmetric chest rise  Heart: regular rate and rhythm  Pulses: 2+ and symmetric  Skin: Skin color, texture, turgor normal. No rashes or lesions  Neurologic: Grossly normal    Righr/Left SHOULDER    Appearance:   normal    Cervical Spine:   No ttp; full, painless ROM; negative Spurlings    Tenderness:   global    AROM:   FF 80/100, Abduction 60/80, ER 30/60, IR side pocket    PROM:  Improves minimally 2/2 pain    Pain:  AROM: Positive  PROM:  Positive  End ROM: Positive  Supraspinatus strength testing: Positive  External rotation strength testing: Positive  Gela-scapular: Positive  Virtually all provacative maneuvers Positive    Strength:  Supraspinatus: weak  External rotation: weak    Provocative Maneuvers:     Kenna 2/2 to patient pain    Pulses: Palpable radial pulse    Neurological deficits: None    The patient has a warm and well-perfused upper extremity with capillary refill less than 2 seconds. Sensation is intact to light touch in terminal nerve distributions. 5/5 ain/pin/uln. The patient has no palpable epitrochlear lymphadenopathy.        Assessment:     Imaging:   Four view radiographs of the right shoulder obtained today personally reviewed " showing no acute fractures or dislocations.  Joint spaces are well maintained.  Humeral head remains seated centrally within the glenoid.    Four view radiographs of the left shoulder obtained today personally reviewed showing no acute fractures or dislocations.  Joint spaces are well maintained.  Humeral head remains seated centrally within the glenoid.      1. Acute pain of both shoulders    2. Acute pain of right shoulder          Plan:       Orders Placed This Encounter    X-Ray Shoulder 2 or More Views Left    X-ray Shoulder 2 or More Views Right    MRI Shoulder Without Contrast Right     Patient sustained a fall with an acute injury to his bilateral upper extremities.  His right is more painful.  He has severe loss of range of motion and strength.  Concern for rotator cuff pathology.  We will proceed with MRI evaluation to further assess this.  Follow-up after its completion to discuss results and treatment plan.  All his questions were answered and he was happy and in agreement.

## 2022-07-18 ENCOUNTER — HOSPITAL ENCOUNTER (EMERGENCY)
Facility: HOSPITAL | Age: 67
Discharge: HOME | End: 2022-07-18
Attending: STUDENT IN AN ORGANIZED HEALTH CARE EDUCATION/TRAINING PROGRAM
Payer: MEDICARE

## 2022-07-18 VITALS
TEMPERATURE: 98 F | OXYGEN SATURATION: 98 % | SYSTOLIC BLOOD PRESSURE: 138 MMHG | DIASTOLIC BLOOD PRESSURE: 60 MMHG | RESPIRATION RATE: 16 BRPM | HEART RATE: 81 BPM

## 2022-07-18 DIAGNOSIS — M54.50 CHRONIC MIDLINE LOW BACK PAIN, UNSPECIFIED WHETHER SCIATICA PRESENT: ICD-10-CM

## 2022-07-18 DIAGNOSIS — G89.29 CHRONIC MIDLINE LOW BACK PAIN, UNSPECIFIED WHETHER SCIATICA PRESENT: ICD-10-CM

## 2022-07-18 DIAGNOSIS — R53.1 GENERALIZED WEAKNESS: Primary | ICD-10-CM

## 2022-07-18 LAB
ALBUMIN SERPL-MCNC: 2.4 GM/DL (ref 3.4–4.8)
ALBUMIN/GLOB SERPL: 0.6 RATIO (ref 1.1–2)
ALP SERPL-CCNC: 109 UNIT/L (ref 40–150)
ALT SERPL-CCNC: 21 UNIT/L (ref 0–55)
AST SERPL-CCNC: 21 UNIT/L (ref 5–34)
BASOPHILS # BLD AUTO: 0.03 X10(3)/MCL (ref 0–0.2)
BASOPHILS NFR BLD AUTO: 0.4 %
BILIRUBIN DIRECT+TOT PNL SERPL-MCNC: 0.5 MG/DL
BUN SERPL-MCNC: 40.1 MG/DL (ref 8.4–25.7)
CALCIUM SERPL-MCNC: 8.5 MG/DL (ref 8.8–10)
CHLORIDE SERPL-SCNC: 98 MMOL/L (ref 98–107)
CO2 SERPL-SCNC: 25 MMOL/L (ref 23–31)
CREAT SERPL-MCNC: 1.52 MG/DL (ref 0.73–1.18)
EOSINOPHIL # BLD AUTO: 0.04 X10(3)/MCL (ref 0–0.9)
EOSINOPHIL NFR BLD AUTO: 0.5 %
ERYTHROCYTE [DISTWIDTH] IN BLOOD BY AUTOMATED COUNT: 16.3 % (ref 11.5–17)
GLOBULIN SER-MCNC: 3.7 GM/DL (ref 2.4–3.5)
GLUCOSE SERPL-MCNC: 149 MG/DL (ref 82–115)
HCT VFR BLD AUTO: 34.9 % (ref 42–52)
HGB BLD-MCNC: 10.8 GM/DL (ref 14–18)
IMM GRANULOCYTES # BLD AUTO: 0.11 X10(3)/MCL (ref 0–0.04)
IMM GRANULOCYTES NFR BLD AUTO: 1.4 %
LYMPHOCYTES # BLD AUTO: 0.86 X10(3)/MCL (ref 0.6–4.6)
LYMPHOCYTES NFR BLD AUTO: 10.7 %
MAGNESIUM SERPL-MCNC: 1.9 MG/DL (ref 1.6–2.6)
MCH RBC QN AUTO: 26.5 PG (ref 27–31)
MCHC RBC AUTO-ENTMCNC: 30.9 MG/DL (ref 33–36)
MCV RBC AUTO: 85.7 FL (ref 80–94)
MONOCYTES # BLD AUTO: 0.81 X10(3)/MCL (ref 0.1–1.3)
MONOCYTES NFR BLD AUTO: 10.1 %
NEUTROPHILS # BLD AUTO: 6.2 X10(3)/MCL (ref 2.1–9.2)
NEUTROPHILS NFR BLD AUTO: 76.9 %
NRBC BLD AUTO-RTO: 0 %
PLATELET # BLD AUTO: 257 X10(3)/MCL (ref 130–400)
PMV BLD AUTO: 9.4 FL (ref 7.4–10.4)
POTASSIUM SERPL-SCNC: 4.8 MMOL/L (ref 3.5–5.1)
PROT SERPL-MCNC: 6.1 GM/DL (ref 5.8–7.6)
RBC # BLD AUTO: 4.07 X10(6)/MCL (ref 4.7–6.1)
SODIUM SERPL-SCNC: 135 MMOL/L (ref 136–145)
WBC # SPEC AUTO: 8 X10(3)/MCL (ref 4.5–11.5)

## 2022-07-18 PROCEDURE — 25000003 PHARM REV CODE 250: Performed by: STUDENT IN AN ORGANIZED HEALTH CARE EDUCATION/TRAINING PROGRAM

## 2022-07-18 PROCEDURE — 99285 EMERGENCY DEPT VISIT HI MDM: CPT | Mod: 25

## 2022-07-18 PROCEDURE — 36415 COLL VENOUS BLD VENIPUNCTURE: CPT | Performed by: STUDENT IN AN ORGANIZED HEALTH CARE EDUCATION/TRAINING PROGRAM

## 2022-07-18 PROCEDURE — 93005 ELECTROCARDIOGRAM TRACING: CPT

## 2022-07-18 PROCEDURE — 93010 EKG 12-LEAD: ICD-10-PCS | Mod: ,,, | Performed by: INTERNAL MEDICINE

## 2022-07-18 PROCEDURE — 80053 COMPREHEN METABOLIC PANEL: CPT | Performed by: STUDENT IN AN ORGANIZED HEALTH CARE EDUCATION/TRAINING PROGRAM

## 2022-07-18 PROCEDURE — 83735 ASSAY OF MAGNESIUM: CPT | Performed by: STUDENT IN AN ORGANIZED HEALTH CARE EDUCATION/TRAINING PROGRAM

## 2022-07-18 PROCEDURE — 85025 COMPLETE CBC W/AUTO DIFF WBC: CPT | Performed by: STUDENT IN AN ORGANIZED HEALTH CARE EDUCATION/TRAINING PROGRAM

## 2022-07-18 PROCEDURE — 93010 ELECTROCARDIOGRAM REPORT: CPT | Mod: ,,, | Performed by: INTERNAL MEDICINE

## 2022-07-18 RX ORDER — METHOCARBAMOL 500 MG/1
1000 TABLET, FILM COATED ORAL
Status: COMPLETED | OUTPATIENT
Start: 2022-07-18 | End: 2022-07-18

## 2022-07-18 RX ADMIN — METHOCARBAMOL 1000 MG: 500 TABLET ORAL at 01:07

## 2022-07-18 NOTE — ED PROVIDER NOTES
Encounter Date: 7/18/2022    SCRIBE #1 NOTE: I, Paulina Beasley, am scribing for, and in the presence of,  Abdiel Camarillo MD. I have scribed the following portions of the note - the EKG reading. Other sections scribed: HPI, ROS, physical exam.   SCRIBE #2 NOTE: I, Ori Pena, am scribing for, and in the presence of,  Abdiel Camarillo MD. I have scribed the following portions of the note - Other sections scribed: HPI, ROS, physical exam.     History     Chief Complaint   Patient presents with    Back Pain     C/O BACK PAIN X 2 WEEKS. DENIES INJURY/TRAUMA. HX OF CHRONIC BACK PAIN     A 68 y/o male with a history of chronic back pain and s/p CABG 6 months ago presenting the ED with c/o recurrent back pain onset 1 week ago. Pt reports shoulder and knee pain. Pt is on pain management, but he states the oxycodone and morphine isn't effective. Pt also reports not having any bowel and bladder incontinence. Pt's main concern appears to be that he is immobile at home.     Pt's PCP is Dr. Charles Alfaro.    The history is provided by the patient and the spouse. No  was used.   Back Pain   This is a recurrent problem. Illness onset: one week ago. The problem has been gradually worsening. The pain is associated with no known injury. Pain location: back pain. Radiates to: left shoulder, bilateral lower extremeties  Exacerbated by: movement. The pain is the same all the time. Pertinent negatives include no chest pain, no fever, no numbness, no headaches, no abdominal pain, no bowel incontinence, no bladder incontinence, no dysuria, no leg pain, no paresthesias, no tingling and no weakness. Treatments tried: oxycodone, morphine. The treatment provided no relief. Risk factors include obesity and a sedentary lifestyle.     Review of patient's allergies indicates:  No Known Allergies  No past medical history on file.  No past surgical history on file.  No family history on file.     Review of Systems    Constitutional: Negative for chills, fatigue and fever.   HENT: Negative for congestion, ear discharge, ear pain, nosebleeds, rhinorrhea and sore throat.    Eyes: Negative for pain, redness and visual disturbance.   Respiratory: Negative for cough, chest tightness and shortness of breath.    Cardiovascular: Negative for chest pain and leg swelling.   Gastrointestinal: Negative for abdominal pain, blood in stool, bowel incontinence, constipation, diarrhea, nausea and vomiting.   Genitourinary: Negative for bladder incontinence, dysuria and hematuria.   Musculoskeletal: Positive for back pain. Negative for arthralgias, joint swelling, myalgias and neck pain.        Shoulder and knee pain    Neurological: Negative for dizziness, tingling, weakness, light-headedness, numbness, headaches and paresthesias.        Denies bladder or bowel incontinence       Physical Exam     Initial Vitals [07/18/22 0907]   BP Pulse Resp Temp SpO2   138/60 81 16 98.2 °F (36.8 °C) 98 %      MAP       --         Physical Exam    Nursing note and vitals reviewed.  Constitutional: He appears well-developed and well-nourished. He is not diaphoretic. No distress.   Exam limited due to pt immobility   HENT:   Head: Normocephalic and atraumatic.   Right Ear: External ear normal.   Left Ear: External ear normal.   Nose: Nose normal.   Mouth/Throat: Oropharynx is clear and moist.   Eyes: Conjunctivae and EOM are normal. Pupils are equal, round, and reactive to light. Right eye exhibits no discharge. Left eye exhibits no discharge.   Neck: Neck supple. No tracheal deviation present.   Normal range of motion.  Cardiovascular: Normal rate, regular rhythm, normal heart sounds and intact distal pulses. Exam reveals no gallop and no friction rub.    No murmur heard.  Pulmonary/Chest: Breath sounds normal. No stridor. No respiratory distress. He has no wheezes. He has no rhonchi. He has no rales. He exhibits no tenderness.   Well-healed sternotomy scar    Abdominal: Abdomen is soft. There is no abdominal tenderness.   Exam limited due to body habitus   Musculoskeletal:         General: No tenderness.      Right shoulder: Decreased range of motion (d/t chronic pain).      Cervical back: Normal range of motion and neck supple.      Right lower leg: 3+ Edema present.      Left lower leg: 3+ Edema present.     Neurological: He is alert and oriented to person, place, and time. No cranial nerve deficit or sensory deficit.   Skin: Skin is warm and dry. Capillary refill takes less than 2 seconds. No rash noted. No erythema. No pallor.         ED Course   Procedures  Labs Reviewed   COMPREHENSIVE METABOLIC PANEL - Abnormal; Notable for the following components:       Result Value    Sodium Level 135 (*)     Glucose Level 149 (*)     Blood Urea Nitrogen 40.1 (*)     Creatinine 1.52 (*)     Calcium Level Total 8.5 (*)     Albumin Level 2.4 (*)     Globulin 3.7 (*)     Albumin/Globulin Ratio 0.6 (*)     All other components within normal limits   CBC WITH DIFFERENTIAL - Abnormal; Notable for the following components:    RBC 4.07 (*)     Hgb 10.8 (*)     Hct 34.9 (*)     MCH 26.5 (*)     MCHC 30.9 (*)     IG# 0.11 (*)     All other components within normal limits   MAGNESIUM - Normal   CBC W/ AUTO DIFFERENTIAL    Narrative:     The following orders were created for panel order CBC auto differential.  Procedure                               Abnormality         Status                     ---------                               -----------         ------                     CBC with Differential[[id number]]        Abnormal            Final result                 Please view results for these tests on the individual orders.     EKG Readings: (Independently Interpreted)   Rhythm: Normal Sinus Rhythm. Heart Rate: 72. ST Segments: Normal ST Segments. T Waves: Normal. Axis: Left Tamiment Deviation. [facility] ED EKG2 - Other Findings: QTc 477.   1251       ECG Results          EKG 12-lead (Final  result)  Result time 07/18/22 14:25:34    Final result by Interface, Lab In [facility] (07/18/22 14:25:34)                 Narrative:    Test Reason : R53.1,    Vent. Rate : 072 BPM     Atrial Rate : 072 BPM     P-R Int : 194 ms          QRS Dur : 102 ms      QT Int : 436 ms       P-R-T Axes : 086 -19 089 degrees     QTc Int : 477 ms    Sinus rhythm with Premature atrial complexes  Otherwise normal ECG  No previous ECGs available  Confirmed by Issa Juarez MD ([id number]) on 7/18/2022 2:25:29 PM    Referred By: [email or web address]   SELF           Confirmed By:Issa Juarez MD                            Imaging Results          CT Lumbar Spine Without Contrast (Final result)  Result time 07/18/22 14:02:45    Final result by Leighton Fry MD (07/18/22 14:02:45)                 Impression:      Multilevel degenerative changes seen throughout the lumbar spine as outlined above      Electronically signed by: Leighton Fry  Date:    07/18/2022  Time:    14:02             Narrative:    EXAMINATION:  CT LUMBAR SPINE WITHOUT CONTRAST    CLINICAL HISTORY:  Low back pain, no red flags, no prior management;    TECHNIQUE:  Low-dose axial, sagittal and coronal reformations are obtained through the lumbar spine.  Contrast was not administered.  Automatic exposure control (AEC) is utilized to reduce patient radiation exposure.    COMPARISON:  None.    FINDINGS:  The vertebral body heights and alignment are well maintained.  No fracture seen.  No dislocation is seen.  There are osteoporotic changes seen throughout the lumbar spine.  Multiple areas of anterior bridging osteophytes are seen in the lower thoracic and upper lumbar spine.  There is multilevel facet arthropathy seen.  There is a broad-based disc bulge seen at the level of L1-L2 and L2-L3 which causes bilateral foraminal stenosis.  There is endplate sclerosis and loss of disc height at L3-L4.  There is an associated broad-based disc osteophyte complex seen.  There is bilateral  foraminal stenosis.  There appears to be acquired spinal stenosis.  The canal is narrowed to 8.3 mm.  Moderate to severe facet arthropathy seen bilaterally.    At L4-5 there is moderate facet arthropathy bilaterally.  There is a broad-based disc osteophyte complex seen.  There is bilateral foraminal stenosis which is worse on the left.    At L5-S1 there is endplate sclerosis and loss of disc height.  There is severe facet arthropathy bilaterally.  There is a large broad-based disc osteophyte complex seen.  There is bilateral foraminal stenosis .  Canal is narrowed to 1.2 cm.                                 Medications   methocarbamoL tablet 1,000 mg (1,000 mg Oral Given 7/18/22 1300)     Medical Decision Making:   Initial Assessment:   Patient with low back pain has been worsening over the past 2 or so weeks.  States it is limiting his ability to get around the house so he came to the emergency department further evaluation.  Differential Diagnosis:   Low back pain, bilateral knee pain, weakness  Clinical Tests:   Lab Tests: Reviewed and Ordered  Radiological Study: Ordered and Reviewed  ED Management:  Patient awake alert oriented.  Obese.  Patient's with chronic knee low back pain right shoulder pain.  Denies any bowel or bladder incontinence.  Appears to be deconditioned.  Has been worked up by orthopedic surgery for both knees, right shoulder.  Does have cardiac history.  His workup.  Emergency department largely unremarkable.  CT lumbar spine with no acute changes, chronic changes only.  Due to his deconditioning difficult to get around I offered him admission for physical therapy further evaluation.  He states he has done physical therapy and he does not want come the hospital for more.  He states he would like to go home and to try to figure this out on my own.  Does ask for some Dilaudid but he is on a pain contract with a physician in [place].  Will discharge home at this time.  Told he is welcome to  come back to the emergency department with any worsening symptoms.  Does not appear to be in any distress.  Patient discharged home it was stable.          Scribe Attestation:   Scribe #1: I performed the above scribed service and the documentation accurately describes the services I performed. I attest to the accuracy of the note.  Scribe #2: I performed the above scribed service and the documentation accurately describes the services I performed. I attest to the accuracy of the note.    Attending Attestation:           Physician Attestation for Scribe:  Physician Attestation Statement for Scribe #1: I, Abdiel Camarillo MD, reviewed documentation, as scribed by Paulina Beasley in my presence, and it is both accurate and complete.   Physician Attestation Statement for Scribe #2: I, Abdiel Camarillo MD, reviewed documentation, as scribed by Ori Pena in my presence, and it is both accurate and complete. I also acknowledge and confirm the content of the note done by Scribe #1.                   Clinical Impression:   Final diagnoses:  [R53.1] Generalized weakness (Primary)  [M54.50, G89.29] Chronic midline low back pain, unspecified whether sciatica present          ED Disposition Condition    Discharge Stable        ED Prescriptions     None        Follow-up Information     Follow up With Specialties Details Why Contact Info    Charles Alfaro Jr., MD Family Medicine In 2 days  [address] [place] [id number]  [phone]             Abdiel Camarillo MD  07/19/22 0631       Abdiel Camarillo MD  07/19/22 0631

## 2022-07-25 ENCOUNTER — TELEPHONE (OUTPATIENT)
Dept: CARDIAC SURGERY | Facility: CLINIC | Age: 67
End: 2022-07-25
Payer: MEDICARE

## 2022-07-27 ENCOUNTER — OFFICE VISIT (OUTPATIENT)
Dept: ORTHOPEDICS | Facility: CLINIC | Age: 67
End: 2022-07-27
Payer: MEDICARE

## 2022-07-27 ENCOUNTER — PATIENT OUTREACH (OUTPATIENT)
Dept: ADMINISTRATIVE | Facility: HOSPITAL | Age: 67
End: 2022-07-27
Payer: MEDICARE

## 2022-07-27 VITALS — HEIGHT: 73 IN | BODY MASS INDEX: 41.75 KG/M2 | WEIGHT: 315 LBS

## 2022-07-27 DIAGNOSIS — S46.011D TRAUMATIC COMPLETE TEAR OF RIGHT ROTATOR CUFF, SUBSEQUENT ENCOUNTER: Primary | ICD-10-CM

## 2022-07-27 PROCEDURE — 99213 PR OFFICE/OUTPT VISIT, EST, LEVL III, 20-29 MIN: ICD-10-PCS | Mod: ,,, | Performed by: REHABILITATION UNIT

## 2022-07-27 PROCEDURE — 99213 OFFICE O/P EST LOW 20 MIN: CPT | Mod: ,,, | Performed by: REHABILITATION UNIT

## 2022-07-27 NOTE — PROGRESS NOTES
Population Health Outreach.Records Received, hyper-linked into chart at this time. The following record(s)  below were uploaded for Health Maintenance .    12/9/2020-COLONOSCOPY

## 2022-08-01 ENCOUNTER — LAB REQUISITION (OUTPATIENT)
Dept: LAB | Facility: HOSPITAL | Age: 67
End: 2022-08-01
Payer: MEDICARE

## 2022-08-01 DIAGNOSIS — E11.65 TYPE 2 DIABETES MELLITUS WITH HYPERGLYCEMIA: ICD-10-CM

## 2022-08-01 DIAGNOSIS — I10 ESSENTIAL (PRIMARY) HYPERTENSION: ICD-10-CM

## 2022-08-01 LAB
CLOSTRIDIUM DIFFICILE GDH ANTIGEN (OHS): NEGATIVE
CLOSTRIDIUM DIFFICILE TOXIN A/B (OHS): NEGATIVE

## 2022-08-01 PROCEDURE — 86318 IA INFECTIOUS AGENT ANTIBODY: CPT | Performed by: FAMILY MEDICINE

## 2022-08-10 ENCOUNTER — OFFICE VISIT (OUTPATIENT)
Dept: ORTHOPEDICS | Facility: CLINIC | Age: 67
End: 2022-08-10
Payer: MEDICARE

## 2022-08-10 VITALS — HEIGHT: 73 IN | WEIGHT: 315 LBS | BODY MASS INDEX: 41.75 KG/M2

## 2022-08-10 DIAGNOSIS — S46.011A TRAUMATIC COMPLETE TEAR OF RIGHT ROTATOR CUFF, INITIAL ENCOUNTER: Primary | ICD-10-CM

## 2022-08-10 DIAGNOSIS — S43.003A SUBLUXATION OF TENDON OF LONG HEAD OF BICEPS: ICD-10-CM

## 2022-08-10 PROCEDURE — 99213 PR OFFICE/OUTPT VISIT, EST, LEVL III, 20-29 MIN: ICD-10-PCS | Mod: ,,, | Performed by: REHABILITATION UNIT

## 2022-08-10 PROCEDURE — 99213 OFFICE O/P EST LOW 20 MIN: CPT | Mod: ,,, | Performed by: REHABILITATION UNIT

## 2022-08-10 NOTE — PROGRESS NOTES
Subjective:      Patient ID: Issa Valladares is a 67 y.o. male.    Chief Complaint: Follow-up (2 wk F/u for right shoulder pain, pt states shoulder feels a little better but not much, pt just started therapy, pt states shoulder is not keeping up at night as much as before, )    HPI:   Issa Valladares is a 67M who presents today for initial evaluation of his bilateral shoulders.  I have seen previously for knee pain.  He reports that since I last saw him he fell within the past couple weeks and landed on his upper extremities.  He has pain to both of his shoulders with the right shoulder bothering him more.  Pain is diffuse and global.  It is bothersome at night.  He has been taking narcotics with little to no relief.  No sensory motor changes reported distally however he has significant loss of range of motion of the shoulder with pain.  Patient has a pertinent past surgical history of right rotator cuff repair around 20-25 years ago.  He recovered well and has had no issues here recently.  Pain is worse with activity and better with rest.  He reports that his knees are improving.    He returns today.  He was initially seen by my partner Dr. Fernando in placed into formal physical therapy.  His pain may be slightly improved although he still having difficulty in range of motion and strength.    Past Medical History:   Diagnosis Date    Aortic stenosis, severe     BPH with obstruction/lower urinary tract symptoms     Essential (primary) hypertension     Fibromyalgia     History of DVT (deep vein thrombosis)     Hx of pulmonary embolus     Personal history of colonic polyps 12/09/2020    COLONOSCOPY    Type 2 diabetes mellitus with unspecified diabetic retinopathy without macular edema     Unspecified osteoarthritis, unspecified site      Past Surgical History:   Procedure Laterality Date    1 vessel CABG (01/07/2022)      Angiogram (05/14/2021)      AVR - Bovine (01/07/2022)      Biopsy  "Gastrointestinal (None) (10/26/2020)      Bleeder Control Gastrointestinal (None)       CHOLECYSTECTOMY      COLONOSCOPY  12/09/2020    Ramon Desouza MD    Colonoscopy (None) (10/26/2020)      Polypectomy (None) (10/26/2020)      Polypectomy (None) (12/09/2020)      RIGHT SHOULDER       Social History     Socioeconomic History    Marital status:    Tobacco Use    Smoking status: Former Smoker    Smokeless tobacco: Never Used   Substance and Sexual Activity    Alcohol use: Yes     Alcohol/week: 1.0 - 2.0 standard drink     Types: 1 - 2 Cans of beer per week    Drug use: Never         Current Outpatient Medications:     amiodarone (PACERONE) 200 MG Tab, Take 200 mg by mouth., Disp: , Rfl:     amLODIPine (NORVASC) 10 MG tablet, Take 10 mg by mouth., Disp: , Rfl:     apixaban (ELIQUIS) 5 mg Tab, Take 1 tablet (5 mg total) by mouth 2 (two) times daily., Disp: 180 tablet, Rfl: 3    atorvastatin (LIPITOR) 10 MG tablet, Take 10 mg by mouth., Disp: , Rfl:     BD VIKAS 2ND GEN PEN NEEDLE 32 gauge x 5/32" Ndle, , Disp: , Rfl:     blood sugar diagnostic Strp, Use as directed three times daily, Disp: , Rfl:     furosemide (LASIX) 40 MG tablet, , Disp: , Rfl:     insulin detemir U-100 (LEVEMIR FLEXTOUCH U-100 INSULN) 100 unit/mL (3 mL) InPn pen, Inject 35 Units into the skin once daily., Disp: 12 mL, Rfl: 5    insulin lispro 100 unit/mL pen, Inject 20 Units into the skin 3 (three) times daily before meals., Disp: 18 mL, Rfl: 11    lancets 33 gauge Misc, 1 each by Other route., Disp: , Rfl:     lisinopriL-hydrochlorothiazide (PRINZIDE,ZESTORETIC) 20-25 mg Tab, Take 1 tablet by mouth 2 (two) times daily., Disp: , Rfl:     metoprolol succinate (TOPROL-XL) 25 MG 24 hr tablet, Take 25 mg by mouth., Disp: , Rfl:     metoprolol succinate (TOPROL-XL) 50 MG 24 hr tablet, Take 50 mg by mouth., Disp: , Rfl:     morphine (MS CONTIN) 15 MG 12 hr tablet, Take 15 mg by mouth 2 (two) times daily as needed., " "Disp: , Rfl:     multivit-iron-FA-calcium-mins 9 mg iron-400 mcg Tab tablet, Take 1 tablet by mouth once daily., Disp: , Rfl:     oxyCODONE-acetaminophen (PERCOCET)  mg per tablet, , Disp: , Rfl:     tiZANidine 4 mg Cap, Take 4 mg by mouth 3 (three) times daily as needed., Disp: , Rfl:   Review of patient's allergies indicates:  No Known Allergies    Ht 6' 1" (1.854 m)   Wt (!) 144.7 kg (319 lb)   BMI 42.09 kg/m²     ROS   Comprehensive review of systems completed and negative except as per HPI.      Objective:    Ortho Exam   Head: Normocephalic, without obvious abnormality, atraumatic  Eyes: conjunctivae/corneas clear. EOM's intact  Ears: normal external appearance  Nose: Nares normal. Septum midline. Mucosa normal. No drainage  Throat: normal findings: lips normal without lesions  Lungs: unlabored breathing on room air  Chest wall: symmetric chest rise  Heart: regular rate and rhythm  Pulses: 2+ and symmetric  Skin: Skin color, texture, turgor normal. No rashes or lesions  Neurologic: Grossly normal    Righr/Left SHOULDER    Appearance:   normal    Cervical Spine:   No ttp; full, painless ROM; negative Spurlings    Tenderness:   global    AROM:   FF 80/100, Abduction 60/80, ER 30/60, IR side pocket    PROM:  Improves minimally 2/2 pain    Pain:  AROM: Positive  PROM:  Positive  End ROM: Positive  Supraspinatus strength testing: Positive  External rotation strength testing: Positive  Gela-scapular: Positive  Virtually all provacative maneuvers Positive    Strength:  Supraspinatus: weak  External rotation: weak    Provocative Maneuvers:     Kenna 2/2 to patient pain    Pulses: Palpable radial pulse    Neurological deficits: None    The patient has a warm and well-perfused upper extremity with capillary refill less than 2 seconds. Sensation is intact to light touch in terminal nerve distributions. 5/5 ain/pin/uln. The patient has no palpable epitrochlear lymphadenopathy.        Assessment:     Imaging: "   Four view radiographs of the right shoulder obtained today personally reviewed showing no acute fractures or dislocations.  Joint spaces are well maintained.  Humeral head remains seated centrally within the glenoid.    MRI was reviewed which shows a full-thickness infra and supraspinatus talar as well as a subscapularis tear.  He has subluxation of the biceps tendon.      1. Traumatic complete tear of right rotator cuff, initial encounter    2. Subluxation of tendon of long head of biceps          Plan:          I have recommended surgical intervention:  Right shoulder arthroscopic rotator cuff repair, biceps tenotomy.  We discussed the details of the procedure and expected postoperative course.  We discussed the benefits of surgery which be decrease his pain and increase his function.  We discussed the risks of surgery which could be significant if he has pain, stiffness, retear, or infection.  After discussion like to proceed.  Plans for surgery after a cardiac clearance from Dr. Ng.

## 2022-08-10 NOTE — PROGRESS NOTES
Subjective:      Patient ID: Issa Valladares is a 67 y.o. male.    Chief Complaint: Results (F/u for MRI results of Right shoulder, pt states shoulder is still bothering him, pt wife states he is not mobile at all, has been sitting and sleeping in lift chair, pt has a bilster on both buttocks as well from sitting in chair so much and would like to know if there is a cream could reccommend )    HPI:   Issa Valladares is a 67M who presents today for follow up evaluation of his bilateral shoulders and to discuss MRI results of his R shoulder.  He reports no significant changes since he was seen last.  He still has significant pain about his right shoulder.  He has continued loss of motion and strength.  He has also started having some issues with reported blistering on his buttocks from remaining in his chair so much.  He reports that it is red at this time with no signs or symptoms of infection.  With regard to his shoulder he still has global pain and weakness.  Pain is somewhat better with rest and is worse with activity.    Initial HPI:   I have seen previously for knee pain.  He reports that since I last saw him he fell within the past couple weeks and landed on his upper extremities.  He has pain to both of his shoulders with the right shoulder bothering him more.  Pain is diffuse and global.  It is bothersome at night.  He has been taking narcotics with little to no relief.  No sensory motor changes reported distally however he has significant loss of range of motion of the shoulder with pain.  Patient has a pertinent past surgical history of right rotator cuff repair around 20-25 years ago.  He recovered well and has had no issues here recently.  Pain is worse with activity and better with rest.  He reports that his knees are improving.    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)      Biopsy Gastrointestinal (None) (10/26/2020)      Bleeder Control Gastrointestinal (None)       CHOLECYSTECTOMY      COLONOSCOPY  12/09/2020    Ramon Desouza MD    Colonoscopy (None) (10/26/2020)      Polypectomy (None) (10/26/2020)      Polypectomy (None) (12/09/2020)      RIGHT SHOULDER       Social History     Socioeconomic History    Marital status:    Tobacco Use    Smoking status: Former Smoker    Smokeless tobacco: Never Used   Substance and Sexual Activity    Alcohol use: Yes     Alcohol/week: 1.0 - 2.0 standard drink     Types: 1 - 2 Cans of beer per week    Drug use: Never         Current Outpatient Medications:     amiodarone (PACERONE) 200 MG Tab, Take 200 mg by mouth., Disp: , Rfl:     amLODIPine (NORVASC) 10 MG tablet, Take 10 mg by mouth., Disp: , Rfl:     apixaban (ELIQUIS) 5 mg Tab, Take 1 tablet (5 mg total) by mouth 2 (two) times daily., Disp: 180 tablet, Rfl: 3    atorvastatin (LIPITOR) 10 MG tablet, Take 10 mg by mouth., Disp: , Rfl:     BD VIKAS 2ND GEN PEN NEEDLE 32 gauge x 5/32" Ndle, , Disp: , Rfl:     blood sugar diagnostic Strp, Use as directed three times daily, Disp: , Rfl:     furosemide (LASIX) 40 MG tablet, , Disp: , Rfl:     insulin detemir U-100 (LEVEMIR FLEXTOUCH U-100 INSULN) 100 unit/mL (3 mL) InPn pen, Inject 35 Units into the skin once daily., Disp: 12 mL, Rfl: 5    insulin lispro 100 unit/mL pen, Inject 20 Units into the skin 3 (three) times daily before meals., Disp: 18 mL, Rfl: 11    lancets 33 gauge Misc, 1 " "each by Other route., Disp: , Rfl:     lisinopriL-hydrochlorothiazide (PRINZIDE,ZESTORETIC) 20-25 mg Tab, Take 1 tablet by mouth 2 (two) times daily., Disp: , Rfl:     metoprolol succinate (TOPROL-XL) 25 MG 24 hr tablet, Take 25 mg by mouth., Disp: , Rfl:     metoprolol succinate (TOPROL-XL) 50 MG 24 hr tablet, Take 50 mg by mouth., Disp: , Rfl:     morphine (MS CONTIN) 15 MG 12 hr tablet, Take 15 mg by mouth 2 (two) times daily as needed., Disp: , Rfl:     multivit-iron-FA-calcium-mins 9 mg iron-400 mcg Tab tablet, Take 1 tablet by mouth once daily., Disp: , Rfl:     oxyCODONE-acetaminophen (PERCOCET)  mg per tablet, , Disp: , Rfl:     tiZANidine 4 mg Cap, Take 4 mg by mouth 3 (three) times daily as needed., Disp: , Rfl:   Review of patient's allergies indicates:  No Known Allergies    Ht 6' 1" (1.854 m)   Wt (!) 144.7 kg (319 lb)   BMI 42.09 kg/m²     ROS   Comprehensive review of systems completed and negative except as per HPI.      Objective:    Ortho Exam   Head: Normocephalic, without obvious abnormality, atraumatic  Eyes: conjunctivae/corneas clear. EOM's intact  Ears: normal external appearance  Nose: Nares normal. Septum midline. Mucosa normal. No drainage  Throat: normal findings: lips normal without lesions  Lungs: unlabored breathing on room air  Chest wall: symmetric chest rise  Heart: regular rate and rhythm  Pulses: 2+ and symmetric  Skin: Skin color, texture, turgor normal. No rashes or lesions  Neurologic: Grossly normal    Righr/Left SHOULDER    Appearance:   normal    Cervical Spine:   No ttp; full, painless ROM; negative Spurlings    Tenderness:   global    AROM:   FF 80/100, Abduction 60/80, ER 30/60, IR side pocket    PROM:  FF 1100/150, Abduction 80/100, ER 40/60    Pain:  AROM: Positive  PROM:  Positive  End ROM: Positive  Supraspinatus strength testing: Positive  External rotation strength testing: Positive  Gela-scapular: Positive  Virtually all provacative maneuvers " Positive    Strength:  Supraspinatus: weak  External rotation: weak    Provocative Maneuvers:     Kenna 2/2 to patient pain    Pulses: Palpable radial pulse    Neurological deficits: None    The patient has a warm and well-perfused upper extremity with capillary refill less than 2 seconds. Sensation is intact to light touch in terminal nerve distributions. 5/5 ain/pin/uln. The patient has no palpable epitrochlear lymphadenopathy.        Assessment:     Imaging:   Four view radiographs of the right shoulder obtained today personally reviewed showing no acute fractures or dislocations.  Joint spaces are well maintained.  Humeral head remains seated centrally within the glenoid.    Four view radiographs of the left shoulder obtained today personally reviewed showing no acute fractures or dislocations.  Joint spaces are well maintained.  Humeral head remains seated centrally within the glenoid.    MRI R shoulder w/o contrast dated 7/13/22:  Impression:     Complete full-thickness tears of the supraspinatus and infraspinatus tendons with moderate tendon retraction as well as moderate infraspinatus muscle atrophy and mild supraspinatus muscle atrophy.     High-grade near full-thickness articular surface tearing of the subscapularis tendon.     Mild distal insertional teres minor tendinosis.     Medial dislocation of the proximal long head biceps tendon with advanced proximal tendinosis and no tear.     Marked distension of the subacromial/subdeltoid bursa with complex fluid.     Prominent strain throughout the infraspinatus and subscapularis muscles.     Mild impaction fracture at the posterolateral humeral head and bony and soft tissue Bankart lesion consistent with a recent dislocation injury.  CT could provide better evaluation of the glenoid fracture morphology.     Moderate degenerative arthrosis at the right AC joint with associated inferior encroachment.      1. Traumatic complete tear of right rotator cuff,  subsequent encounter          Plan:       Orders Placed This Encounter    Ambulatory referral/consult to Physical/Occupational Therapy     Patient sustained a fall with an acute injury to his bilateral upper extremities.  His right is more painful.  He has severe loss of range of motion and strength.  MRI shows significant full thickness tears of his rotator cuff and evidence of previous dislocation. Discussed options. Will send to PT to work on motion. Will also have him see his numerous medical providers for medical optimization. Follow-up in two weeks for motion check and to further discuss surgical options.  All his questions were answered and he was happy and in agreement.

## 2022-09-18 RX ORDER — LIDOCAINE HYDROCHLORIDE 20 MG/ML
2 INJECTION, SOLUTION INFILTRATION; PERINEURAL
Status: DISCONTINUED | OUTPATIENT
Start: 2022-06-29 | End: 2022-09-18 | Stop reason: HOSPADM

## 2022-09-18 RX ORDER — BETAMETHASONE SODIUM PHOSPHATE AND BETAMETHASONE ACETATE 3; 3 MG/ML; MG/ML
12 INJECTION, SUSPENSION INTRA-ARTICULAR; INTRALESIONAL; INTRAMUSCULAR; SOFT TISSUE
Status: DISCONTINUED | OUTPATIENT
Start: 2022-06-29 | End: 2022-09-18 | Stop reason: HOSPADM

## 2024-08-19 NOTE — DISCHARGE INSTRUCTIONS
Return to the emergency department if you develop worsening symptoms including: fever, chills, chest pain, shortness of breath, weakness, numbness, tingling, nausea, vomiting, inability to eat, drink, or take your medication.    Please continue to take all medications as prescribed.    Please follow-up with the orthopedic surgeon, pain management specialist, your other physicians for further evaluation and management.    Return to the emergency department if you have worsening symptoms fever chills weakness numbness tingling inability control your bowel or bladder or have a change of heart.  You are welcome to return at any time.   There are no Wet Read(s) to document.